# Patient Record
Sex: FEMALE | Race: OTHER | Employment: FULL TIME | URBAN - NONMETROPOLITAN AREA
[De-identification: names, ages, dates, MRNs, and addresses within clinical notes are randomized per-mention and may not be internally consistent; named-entity substitution may affect disease eponyms.]

---

## 2018-04-09 ENCOUNTER — HOSPITAL ENCOUNTER (OUTPATIENT)
Dept: GENERAL RADIOLOGY | Age: 23
Discharge: HOME | End: 2018-04-11
Payer: COMMERCIAL

## 2018-04-09 ENCOUNTER — OFFICE VISIT (OUTPATIENT)
Dept: PRIMARY CARE CLINIC | Age: 23
End: 2018-04-09
Payer: COMMERCIAL

## 2018-04-09 VITALS
DIASTOLIC BLOOD PRESSURE: 78 MMHG | OXYGEN SATURATION: 98 % | SYSTOLIC BLOOD PRESSURE: 130 MMHG | BODY MASS INDEX: 30.16 KG/M2 | HEIGHT: 65 IN | HEART RATE: 94 BPM | RESPIRATION RATE: 16 BRPM | TEMPERATURE: 98.9 F | WEIGHT: 181 LBS

## 2018-04-09 DIAGNOSIS — J02.9 SORE THROAT: ICD-10-CM

## 2018-04-09 DIAGNOSIS — J11.1 INFLUENZA-LIKE ILLNESS: ICD-10-CM

## 2018-04-09 DIAGNOSIS — R50.9 FEVER, UNSPECIFIED FEVER CAUSE: Primary | ICD-10-CM

## 2018-04-09 DIAGNOSIS — J01.41 ACUTE RECURRENT PANSINUSITIS: ICD-10-CM

## 2018-04-09 DIAGNOSIS — R05.9 COUGH: ICD-10-CM

## 2018-04-09 LAB
INFLUENZA A ANTIBODY: NORMAL
INFLUENZA B ANTIBODY: NORMAL
S PYO AG THROAT QL: NORMAL

## 2018-04-09 PROCEDURE — 87804 INFLUENZA ASSAY W/OPTIC: CPT | Performed by: PHYSICIAN ASSISTANT

## 2018-04-09 PROCEDURE — 99213 OFFICE O/P EST LOW 20 MIN: CPT | Performed by: PHYSICIAN ASSISTANT

## 2018-04-09 PROCEDURE — 87880 STREP A ASSAY W/OPTIC: CPT | Performed by: PHYSICIAN ASSISTANT

## 2018-04-09 PROCEDURE — 71046 X-RAY EXAM CHEST 2 VIEWS: CPT

## 2018-04-09 RX ORDER — BENZONATATE 200 MG/1
200 CAPSULE ORAL 3 TIMES DAILY PRN
Qty: 30 CAPSULE | Refills: 1 | Status: SHIPPED | OUTPATIENT
Start: 2018-04-09 | End: 2018-04-16

## 2018-04-09 RX ORDER — LEVOFLOXACIN 500 MG/1
500 TABLET, FILM COATED ORAL DAILY
Qty: 10 TABLET | Refills: 0 | Status: SHIPPED | OUTPATIENT
Start: 2018-04-09 | End: 2018-04-19

## 2018-04-09 RX ORDER — OSELTAMIVIR PHOSPHATE 75 MG/1
75 CAPSULE ORAL 2 TIMES DAILY
Qty: 10 CAPSULE | Refills: 0 | Status: SHIPPED | OUTPATIENT
Start: 2018-04-09 | End: 2018-04-14

## 2018-04-09 RX ORDER — VENLAFAXINE HYDROCHLORIDE 37.5 MG/1
CAPSULE, EXTENDED RELEASE ORAL DAILY
COMMUNITY
Start: 2018-03-23

## 2018-04-09 ASSESSMENT — ENCOUNTER SYMPTOMS
WHEEZING: 1
TROUBLE SWALLOWING: 1
CHEST TIGHTNESS: 1
RHINORRHEA: 1
SORE THROAT: 1
COUGH: 1
SHORTNESS OF BREATH: 1
SINUS PRESSURE: 1
VOMITING: 0
NAUSEA: 1
DIARRHEA: 0
SINUS PAIN: 0

## 2018-11-05 ENCOUNTER — OFFICE VISIT (OUTPATIENT)
Dept: PRIMARY CARE CLINIC | Age: 23
End: 2018-11-05

## 2018-11-05 ENCOUNTER — OFFICE VISIT (OUTPATIENT)
Dept: OPTOMETRY | Age: 23
End: 2018-11-05
Payer: COMMERCIAL

## 2018-11-05 VITALS
DIASTOLIC BLOOD PRESSURE: 80 MMHG | TEMPERATURE: 97 F | WEIGHT: 186 LBS | OXYGEN SATURATION: 100 % | SYSTOLIC BLOOD PRESSURE: 132 MMHG | HEART RATE: 96 BPM | BODY MASS INDEX: 30.99 KG/M2 | HEIGHT: 65 IN

## 2018-11-05 DIAGNOSIS — H57.89 EYE SWELLING, LEFT: Primary | ICD-10-CM

## 2018-11-05 DIAGNOSIS — H00.025 HORDEOLUM INTERNUM OF LEFT LOWER EYELID: Primary | ICD-10-CM

## 2018-11-05 PROCEDURE — 99203 OFFICE O/P NEW LOW 30 MIN: CPT | Performed by: OPTOMETRIST

## 2018-11-05 RX ORDER — MINOCYCLINE HYDROCHLORIDE 100 MG/1
100 CAPSULE ORAL 2 TIMES DAILY
Qty: 28 CAPSULE | Refills: 1 | Status: SHIPPED | OUTPATIENT
Start: 2018-11-05 | End: 2018-11-19

## 2018-11-05 ASSESSMENT — VISUAL ACUITY
OS_CC: 20/20
CORRECTION_TYPE: GLASSES
METHOD: SNELLEN - LINEAR

## 2018-11-05 ASSESSMENT — SLIT LAMP EXAM - LIDS: COMMENTS: NORMAL

## 2018-11-05 NOTE — LETTER
[facility]  [place]  Phone: [phone]  Fax: [facility] APRN - CNP        November 5, 2018     Patient: Joann Ramírez   YOB: 1995   Date of Visit: 11/5/2018       To Whom it May Concern:    Joann Ramírez was seen in my clinic on 11/5/2018. She may return to work on 11/6/18. If you have any questions or concerns, please don't hesitate to call.     Sincerely,         Michelle Pendleton, APRN - CNP

## 2022-04-29 ENCOUNTER — OFFICE VISIT (OUTPATIENT)
Dept: PRIMARY CARE CLINIC | Age: 27
End: 2022-04-29
Payer: COMMERCIAL

## 2022-04-29 ENCOUNTER — HOSPITAL ENCOUNTER (OUTPATIENT)
Age: 27
Setting detail: SPECIMEN
Discharge: HOME | End: 2022-04-29
Payer: COMMERCIAL

## 2022-04-29 VITALS
DIASTOLIC BLOOD PRESSURE: 98 MMHG | TEMPERATURE: 97.4 F | BODY MASS INDEX: 28.66 KG/M2 | OXYGEN SATURATION: 99 % | SYSTOLIC BLOOD PRESSURE: 150 MMHG | HEIGHT: 65 IN | WEIGHT: 172 LBS | HEART RATE: 71 BPM

## 2022-04-29 DIAGNOSIS — Z20.2 EXPOSURE TO SEXUALLY TRANSMITTED DISEASE (STD): Primary | ICD-10-CM

## 2022-04-29 DIAGNOSIS — Z20.2 EXPOSURE TO SEXUALLY TRANSMITTED DISEASE (STD): ICD-10-CM

## 2022-04-29 PROCEDURE — 86696 HERPES SIMPLEX TYPE 2 TEST: CPT

## 2022-04-29 PROCEDURE — 87389 HIV-1 AG W/HIV-1&-2 AB AG IA: CPT

## 2022-04-29 PROCEDURE — 86695 HERPES SIMPLEX TYPE 1 TEST: CPT

## 2022-04-29 PROCEDURE — 99213 OFFICE O/P EST LOW 20 MIN: CPT | Performed by: NURSE PRACTITIONER

## 2022-04-29 PROCEDURE — 87491 CHLMYD TRACH DNA AMP PROBE: CPT | Performed by: NURSE PRACTITIONER

## 2022-04-29 PROCEDURE — 86803 HEPATITIS C AB TEST: CPT

## 2022-04-29 PROCEDURE — 87340 HEPATITIS B SURFACE AG IA: CPT

## 2022-04-29 PROCEDURE — 87491 CHLMYD TRACH DNA AMP PROBE: CPT

## 2022-04-29 PROCEDURE — 87591 N.GONORRHOEAE DNA AMP PROB: CPT | Performed by: NURSE PRACTITIONER

## 2022-04-29 PROCEDURE — 36415 COLL VENOUS BLD VENIPUNCTURE: CPT

## 2022-04-29 PROCEDURE — 87591 N.GONORRHOEAE DNA AMP PROB: CPT

## 2022-04-29 PROCEDURE — 86694 HERPES SIMPLEX NES ANTBDY: CPT

## 2022-04-29 PROCEDURE — 86780 TREPONEMA PALLIDUM: CPT

## 2022-04-29 SDOH — ECONOMIC STABILITY: FOOD INSECURITY: WITHIN THE PAST 12 MONTHS, THE FOOD YOU BOUGHT JUST DIDN'T LAST AND YOU DIDN'T HAVE MONEY TO GET MORE.: NEVER TRUE

## 2022-04-29 SDOH — ECONOMIC STABILITY: FOOD INSECURITY: WITHIN THE PAST 12 MONTHS, YOU WORRIED THAT YOUR FOOD WOULD RUN OUT BEFORE YOU GOT MONEY TO BUY MORE.: NEVER TRUE

## 2022-04-29 ASSESSMENT — ENCOUNTER SYMPTOMS
ABDOMINAL PAIN: 0
WHEEZING: 0
COUGH: 0
BACK PAIN: 0
CONSTIPATION: 0
CHEST TIGHTNESS: 0
DIARRHEA: 0
SHORTNESS OF BREATH: 0

## 2022-04-29 ASSESSMENT — PATIENT HEALTH QUESTIONNAIRE - PHQ9
SUM OF ALL RESPONSES TO PHQ QUESTIONS 1-9: 0
2. FEELING DOWN, DEPRESSED OR HOPELESS: 0
SUM OF ALL RESPONSES TO PHQ QUESTIONS 1-9: 0
1. LITTLE INTEREST OR PLEASURE IN DOING THINGS: 0
SUM OF ALL RESPONSES TO PHQ QUESTIONS 1-9: 0
SUM OF ALL RESPONSES TO PHQ9 QUESTIONS 1 & 2: 0
SUM OF ALL RESPONSES TO PHQ QUESTIONS 1-9: 0

## 2022-04-29 ASSESSMENT — SOCIAL DETERMINANTS OF HEALTH (SDOH): HOW HARD IS IT FOR YOU TO PAY FOR THE VERY BASICS LIKE FOOD, HOUSING, MEDICAL CARE, AND HEATING?: NOT HARD AT ALL

## 2022-04-29 NOTE — PATIENT INSTRUCTIONS
My Chart Information    Go to [email or web address]    Username: [email or web address]  Password: [id number]      Patient Education        Exposure to Sexually Transmitted Infections: Care Instructions  Overview  Sexually transmitted infections (STIs) are diseases spread by sexual contact. This includes vaginal, oral, and anal sex. If you're pregnant, you can alsospread them to your baby before or during the birth. There are at least 20 different STIs. They include chlamydia, gonorrhea, syphilis, and human immunodeficiency virus (HIV). (This is the virus thatcauses AIDS.) Some STIs can reduce the chance of getting pregnant in the future. Treatment can cure STIs caused by bacteria. STIs caused by viruses, such asHIV, can be treated, but they can't be cured. Follow-up care is a key part of your treatment and safety. Be sure to make and go to all appointments, and call your doctor if you are having problems. It's also a good idea to know your test results and keep alist of the medicines you take. How can you care for yourself at home?  Take medicines exactly as prescribed.  If your doctor prescribed antibiotics, take them as directed. Don't stop taking them just because you feel better. You need to take the full course of antibiotics.  Tell your sex partner(s) that they will need treatment.  Don't douche. Douching changes the normal balance of bacteria in your vagina. It may increase the risk of spreading the infection to your uterus or other reproductive organs. How can you prevent sexually transmitted infections (STIs)? You can help prevent STIs if you wait to have sex with a new partner (or partners) until you've each been tested for STIs. It also helps if you use condoms (male or female condoms) and if you limit your sex partners to [place] who only has sex with you. When should you call for help?    Call your doctor now or seek immediate medical care if:     You have new pain in your belly or pelvis.      You have symptoms of a urinary tract infection. These may include:  ? Pain or burning when you urinate. ? A frequent need to urinate without being able to pass much urine. ? Pain in the flank, which is just below the rib cage and above the waist on either side of the back. ? Blood in your urine. ? A fever.      You have new or worsening pain or swelling in the scrotum. Watch closely for changes in your health, and be sure to contact your doctor if:     You have unusual vaginal bleeding.      You have a discharge from the vagina or penis.      You have any new symptoms, such as sores, bumps, rashes, blisters, or warts.      You have itching, tingling, pain, or burning in the genital or anal area.      You think you may have an STI. Where can you learn more? Go to [email or web address]://chlesiaeb.healthWellDoc. org and sign in to your [employer] account. Enter M498 in the [employer] box to learn more about \"Exposure to Sexually Transmitted Infections: Care Instructions. \"     If you do not have an account, please click on the \"Sign Up Now\" link. Current as of: November 17, 2021               Content Version: 13.2  © 4551-7820 Healthwise, [facility]. Care instructions adapted under license by [facility] ([facility]). If you have questions about a medical condition or this instruction, always ask your healthcare professional. [address]  any warranty or liability for your use of this information.

## 2022-04-29 NOTE — PROGRESS NOTES
[address] [facility] A department of [facility]  Phone: [phone]  Fax: [phone]      Bhavya Hough is a 28 y.o. female who presents to the [facility] [place] [place] [facility]gent [facility] today for her medical conditions/complaints as noted below. Bhavya Hough is c/o of Other (pt states her partner was possibly exposed to herpes and would like checked . not symptomatic )          HPI:     Partner may have been exposed to Herpes. Patient currently does not have significant symptoms of herpes but she does have a few red raised areas in her super pubic area that she has had previously and she thinks this is partially razor burn. Her sexual partner does have some symptoms that are suspicious for herpes. Other  Pertinent negatives include no abdominal pain, chest pain, chills, coughing, fatigue, fever, myalgias or weakness. History reviewed. No pertinent past medical history. No Known Allergies    Wt Readings from Last 3 Encounters:   04/29/22 172 lb (78 kg)   11/05/18 186 lb (84.4 kg)   04/09/18 181 lb (82.1 kg)     BP Readings from Last 3 Encounters:   04/29/22 (!) 150/98   11/05/18 132/80   04/09/18 130/78      Temp Readings from Last 3 Encounters:   04/29/22 97.4 °F (36.3 °C) (Tympanic)   11/05/18 97 °F (36.1 °C) (Tympanic)   04/09/18 98.9 °F (37.2 °C)     Pulse Readings from Last 3 Encounters:   04/29/22 71   11/05/18 96   04/09/18 94     SpO2 Readings from Last 3 Encounters:   04/29/22 99%   11/05/18 100%   04/09/18 98%       Subjective:      Review of Systems   Constitutional: Negative for activity change, appetite change, chills, fatigue and fever. HENT: Negative for sneezing. Eyes: Negative for visual disturbance. Respiratory: Negative for cough, chest tightness, shortness of breath and wheezing. Cardiovascular: Negative for chest pain, palpitations and leg swelling.    Gastrointestinal: Negative for abdominal pain, constipation and diarrhea. Endocrine: Negative for polydipsia, polyphagia and polyuria. Genitourinary: Negative for difficulty urinating. Musculoskeletal: Negative for back pain and myalgias. Allergic/Immunologic: Negative for environmental allergies. Neurological: Negative for dizziness, syncope, weakness and light-headedness. Psychiatric/Behavioral: Negative for dysphoric mood. All other systems reviewed and are negative. Objective:     Vitals:    04/29/22 1725   BP: (!) 150/98   Site: Left Upper Arm   Position: Sitting   Cuff Size: Medium Adult   Pulse: 71   Temp: 97.4 °F (36.3 °C)   TempSrc: Tympanic   SpO2: 99%   Weight: 172 lb (78 kg)   Height: 5' 5\" (1.651 m)     Body mass index is 28.62 kg/m². BP (!) 150/98 (Site: Left Upper Arm, Position: Sitting, Cuff Size: Medium Adult)   Pulse 71   Temp 97.4 °F (36.3 °C) (Tympanic)   Ht 5' 5\" (1.651 m)   Wt 172 lb (78 kg)   LMP 04/08/2022 (Within Days)   SpO2 99%   BMI 28.62 kg/m²   Physical Exam  Vitals and nursing note reviewed. Constitutional:       General: She is not in acute distress. Appearance: She is normal weight. She is not ill-appearing. HENT:      Head: Normocephalic. Nose: Nose normal.      Mouth/Throat:      Mouth: Mucous membranes are moist.   Eyes:      Extraocular Movements: Extraocular movements intact. Conjunctiva/sclera: Conjunctivae normal.      Pupils: Pupils are equal, round, and reactive to light. Cardiovascular:      Rate and Rhythm: Normal rate and regular rhythm. Pulses: Normal pulses. Heart sounds: Normal heart sounds. Pulmonary:      Effort: Pulmonary effort is normal.      Breath sounds: Normal breath sounds. Genitourinary:     Pubic Area: Rash present. Comments: Scattered reddened areas along pubic region. Possible razor burn. No drainage or pustules noted. Musculoskeletal:         General: Normal range of motion. Cervical back: Normal range of motion.    Skin:     General: Skin is warm. Capillary Refill: Capillary refill takes less than 2 seconds. Neurological:      General: No focal deficit present. Mental Status: She is alert and oriented to person, place, and time. Mental status is at baseline. Psychiatric:         Mood and Affect: Mood normal.         Behavior: Behavior normal.         Judgment: Judgment normal.         Assessment:      Diagnosis Orders   1. Exposure to sexually transmitted disease (STD)  C.trachomatis N.gonorrhoeae DNA, Urine    Hepatitis B Surface Antigen    Hepatitis C Antibody    HIV Screen    T. pallidum Ab    Herpes Profile       Plan:   Discussed testing for STI's with patient. She agreed to full panel work up with understanding that herpes profile may result in a positive if she has had a history of cold sores. Will call patient with results and she will also be able to view them through [employer]    Discussed exam, POCT findings, plan of care, and follow-up at length with patient. Reviewed all prescribed and recommended medications, administration and side effects. Encouraged patient to follow up with PCP or return to the clinic for no improvement and or worsening of symptoms. Patient instructed to go to ER or call 911 if any difficulty breathing, shortness of breath, inability to swallow, hives or temp greater than 103 degrees. All questions were answered and they verbalized understanding and were agreeable with the plan. Return if symptoms worsen or fail to improve.         Electronically signed by Coleta Schirmer, APRN - CNP on 4/29/2022 at 5:49 PM

## 2022-04-30 LAB
HEPATITIS B SURFACE ANTIGEN: NONREACTIVE
HEPATITIS C ANTIBODY: NONREACTIVE
HIV AG/AB: NONREACTIVE
T. PALLIDUM, IGG: NONREACTIVE

## 2022-05-02 LAB
C. TRACHOMATIS DNA ,URINE: NEGATIVE
N. GONORRHOEAE DNA, URINE: NEGATIVE
SPECIMEN DESCRIPTION: NORMAL

## 2022-05-03 LAB
HERPES SIMPLEX VIRUS 1 IGG: 18.05
HERPES SIMPLEX VIRUS 2 IGG: 0.78
HERPES TYPE 1/2 IGM COMBINED: 1.1

## 2025-04-25 ENCOUNTER — OFFICE VISIT (OUTPATIENT)
Dept: PRIMARY CARE CLINIC | Age: 30
End: 2025-04-25
Payer: COMMERCIAL

## 2025-04-25 ENCOUNTER — HOSPITAL ENCOUNTER (OUTPATIENT)
Age: 30
Setting detail: SPECIMEN
Discharge: HOME | End: 2025-04-25
Payer: COMMERCIAL

## 2025-04-25 VITALS
BODY MASS INDEX: 28.29 KG/M2 | OXYGEN SATURATION: 97 % | HEART RATE: 74 BPM | DIASTOLIC BLOOD PRESSURE: 70 MMHG | WEIGHT: 170 LBS | TEMPERATURE: 98 F | SYSTOLIC BLOOD PRESSURE: 126 MMHG | RESPIRATION RATE: 20 BRPM

## 2025-04-25 DIAGNOSIS — L03.90 CELLULITIS, UNSPECIFIED CELLULITIS SITE: ICD-10-CM

## 2025-04-25 DIAGNOSIS — L03.90 CELLULITIS, UNSPECIFIED CELLULITIS SITE: Primary | ICD-10-CM

## 2025-04-25 PROCEDURE — 87070 CULTURE OTHR SPECIMN AEROBIC: CPT

## 2025-04-25 PROCEDURE — 99213 OFFICE O/P EST LOW 20 MIN: CPT

## 2025-04-25 PROCEDURE — 87205 SMEAR GRAM STAIN: CPT

## 2025-04-25 RX ORDER — CEPHALEXIN 500 MG/1
500 CAPSULE ORAL 3 TIMES DAILY
Qty: 21 CAPSULE | Refills: 0 | Status: SHIPPED | OUTPATIENT
Start: 2025-04-25 | End: 2025-05-02

## 2025-04-25 NOTE — PROGRESS NOTES
[facility] Walk In department of [facility]  [address] [facility] [address]  Phone: [phone]  Fax: [phone]      Nano Wylie  1995  MRN: [id number]  Date of visit: 4/25/2025    Chief Complaint:     Nano Wylie is here for c/o of Rash (On breast , noticed it four days ago. Red and itchy, hurts to touch )      HPI:     Nano Wylie is a 30 y.o. female who presents to the [facility] Care today for her medical conditions/complaints as noted below.    History of Present Illness  The patient is a 30-year-old female who presents today with a rash on her breast that is red, itchy, and hurts to touch.    She has been experiencing irritation and leakage from her breast for approximately 5 days. The discharge, which originates from both the nipple and surrounding skin, appears clear initially but dries to a yellow color on the gauze pad. She reports no systemic symptoms such as fever or chills. Despite these interventions, the condition has progressively worsened, with the rash spreading from the initial site at the bottom of the breast to encompass the entire area. She also reports a sensation of warmth in the affected area. 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 She has been managing the condition at home with triple antibiotic ointment to prevent adhesion to the gauze and has been covering the area with a Band-Aid during sleep. She had a similar episode 8 years ago, which was diagnosed as cellulitis and treated accordingly.    MEDICATIONS  Current: Triple antibiotic ointment    History reviewed. No pertinent past medical history.     No Known Allergies      Subjective:      Review of Systems   Constitutional:  Negative for chills and fever.   Skin:  Positive for rash.       Objective:     Vitals:    04/25/25 1011   BP: 126/70

## 2025-04-25 NOTE — PATIENT INSTRUCTIONS
Will call with results of culture  Start antibiotics as prescribed  Complete whole course of antibiotics  May use tylenol and ibuprofen for pain/ fever  Keep area clean and dry  If symptoms worsen follow up with PCP or return to walk in clinic  Patient verbalized understanding and agrees with plan of care

## 2025-04-27 ENCOUNTER — RESULTS FOLLOW-UP (OUTPATIENT)
Dept: PRIMARY CARE CLINIC | Age: 30
End: 2025-04-27

## 2025-04-27 LAB
MICROORGANISM SPEC CULT: NORMAL
MICROORGANISM/AGENT SPEC: NORMAL
MICROORGANISM/AGENT SPEC: NORMAL
SPECIMEN DESCRIPTION: NORMAL

## 2025-05-22 ENCOUNTER — OFFICE VISIT (OUTPATIENT)
Dept: PRIMARY CARE CLINIC | Age: 30
End: 2025-05-22
Payer: COMMERCIAL

## 2025-05-22 VITALS
DIASTOLIC BLOOD PRESSURE: 82 MMHG | BODY MASS INDEX: 34.91 KG/M2 | OXYGEN SATURATION: 100 % | SYSTOLIC BLOOD PRESSURE: 124 MMHG | HEART RATE: 72 BPM | WEIGHT: 197 LBS | HEIGHT: 63 IN

## 2025-05-22 DIAGNOSIS — L30.9 DERMATITIS OF NIPPLE: Primary | ICD-10-CM

## 2025-05-22 PROCEDURE — 99213 OFFICE O/P EST LOW 20 MIN: CPT

## 2025-05-22 RX ORDER — TRIAMCINOLONE ACETONIDE 1 MG/G
CREAM TOPICAL
Qty: 80 G | Refills: 1 | Status: SHIPPED | OUTPATIENT
Start: 2025-05-22

## 2025-05-22 RX ORDER — TRIAMCINOLONE ACETONIDE 1 MG/G
CREAM TOPICAL
Qty: 80 G | Refills: 1 | Status: SHIPPED | OUTPATIENT
Start: 2025-05-22 | End: 2025-05-22

## 2025-05-22 RX ORDER — ACETAMINOPHEN 500 MG
1000 TABLET ORAL EVERY 6 HOURS PRN
COMMUNITY

## 2025-05-22 NOTE — PROGRESS NOTES
[facility] Walk In department of [facility]  [address] [facility] [address]  Phone: [phone]  Fax: [phone]      Nano Wylie  1995  MRN: [id number]  Date of visit: 5/22/2025    Chief Complaint:     Nano Wylie is here for c/o of Rash (Recheck--Pain and drainage from Left and now Right breast/ nipple--thought it was adhesive, but stopped using the dressing and rash has stayed and spread to right side.)      HPI:     Nano Wylie is a 30 y.o. female who presents to the [facility] Care today for her medical conditions/complaints as noted below.    History of Present Illness  The patient is a 30-year-old female who presents with a rash on her left and right nipples, which is spreading.    She reports that the rash has extended to the sides of her breasts. Despite completing two courses of antibiotics, there has been no improvement. She developed a rash, which she initially attributed to the adhesive tape used to secure gauze pads. She discontinued the use of the tape upon noticing the rash, but it persists even after a week. The rash is also seeping. She maintains hygiene by showering three times daily and using antibacterial soap. She reports no fever, nausea, or other pain. She experiences pain when her nipples are erect and sensitive to touch or pressure. She also reports dry skin, which is a new symptom for her, and has been applying lotion. She does not experience any pain in her armpits and does not feel any lumps or bumps. She expresses concern about the possibility of needing a biopsy. She continues to experience nipple discharge, necessitating hourly changes of her gauze pad. The discharge is clear initially but turns yellow upon drying. She has not applied any topical ointment since her last visit. She has no history of eczema but has always had dry skin. She is not currently under the care of a gynecologist. She recalls a previous

## 2025-05-22 NOTE — PATIENT INSTRUCTIONS
Topical steroid x 2 weeks  Continue washing with antibacterial hand soap  Contact us if there is no improvement  If symptoms worsen, seek medical treatment  Patient verbalized understanding and agrees with plan of care